# Patient Record
Sex: FEMALE | URBAN - METROPOLITAN AREA
[De-identification: names, ages, dates, MRNs, and addresses within clinical notes are randomized per-mention and may not be internally consistent; named-entity substitution may affect disease eponyms.]

---

## 2017-05-15 ENCOUNTER — APPOINTMENT (RX ONLY)
Dept: URBAN - METROPOLITAN AREA CLINIC 48 | Facility: CLINIC | Age: 82
Setting detail: DERMATOLOGY
End: 2017-05-15

## 2017-05-15 DIAGNOSIS — L81.5 LEUKODERMA, NOT ELSEWHERE CLASSIFIED: ICD-10-CM

## 2017-05-15 DIAGNOSIS — L82.1 OTHER SEBORRHEIC KERATOSIS: ICD-10-CM

## 2017-05-15 DIAGNOSIS — L57.8 OTHER SKIN CHANGES DUE TO CHRONIC EXPOSURE TO NONIONIZING RADIATION: ICD-10-CM

## 2017-05-15 PROBLEM — L85.3 XEROSIS CUTIS: Status: ACTIVE | Noted: 2017-05-15

## 2017-05-15 PROCEDURE — ? COUNSELING

## 2017-05-15 PROCEDURE — 99202 OFFICE O/P NEW SF 15 MIN: CPT

## 2017-05-15 ASSESSMENT — LOCATION DETAILED DESCRIPTION DERM
LOCATION DETAILED: RIGHT INFERIOR LATERAL MALAR CHEEK
LOCATION DETAILED: RIGHT LATERAL MALAR CHEEK
LOCATION DETAILED: RIGHT PROXIMAL DORSAL FOREARM
LOCATION DETAILED: UPPER STERNUM
LOCATION DETAILED: LEFT INFERIOR CENTRAL MALAR CHEEK
LOCATION DETAILED: LEFT PROXIMAL DORSAL FOREARM

## 2017-05-15 ASSESSMENT — LOCATION SIMPLE DESCRIPTION DERM
LOCATION SIMPLE: LEFT CHEEK
LOCATION SIMPLE: RIGHT CHEEK
LOCATION SIMPLE: CHEST
LOCATION SIMPLE: LEFT FOREARM
LOCATION SIMPLE: RIGHT FOREARM

## 2017-05-15 ASSESSMENT — LOCATION ZONE DERM
LOCATION ZONE: FACE
LOCATION ZONE: TRUNK
LOCATION ZONE: ARM

## 2017-05-15 NOTE — PROCEDURE: MIPS QUALITY
Detail Level: Detailed
Quality 110: Preventive Care And Screening: Influenza Immunization: Influenza Immunization previously received during influenza season
Quality 130: Documentation Of Current Medications In The Medical Record: Current Medications Documented
Quality 226: Preventive Care And Screening: Tobacco Use: Screening And Cessation Intervention: Patient screened for tobacco and never smoked
Quality 131: Pain Assessment And Follow-Up: Pain assessment using a standardized tool is documented as negative, no follow-up plan required
Quality 111:Pneumonia Vaccination Status For Older Adults: Pneumococcal Vaccination Previously Received
Quality 47: Advance Care Plan: Advance Care Planning discussed and documented in the medical record; patient did not wish or was not able to name a surrogate decision maker or provide an advance care plan.

## 2018-02-07 ENCOUNTER — ESTABLISHED COMPREHENSIVE EXAM (OUTPATIENT)
Dept: URBAN - METROPOLITAN AREA CLINIC 39 | Facility: CLINIC | Age: 83
End: 2018-02-07

## 2018-02-07 DIAGNOSIS — Z96.1: ICD-10-CM

## 2018-02-07 DIAGNOSIS — H35.373: ICD-10-CM

## 2018-02-07 DIAGNOSIS — Z94.7: ICD-10-CM

## 2018-02-07 PROCEDURE — G8428 CUR MEDS NOT DOCUMENT: HCPCS

## 2018-02-07 PROCEDURE — 92015 DETERMINE REFRACTIVE STATE: CPT

## 2018-02-07 PROCEDURE — 92014 COMPRE OPH EXAM EST PT 1/>: CPT

## 2018-02-07 PROCEDURE — 1036F TOBACCO NON-USER: CPT

## 2018-02-07 ASSESSMENT — VISUAL ACUITY
OD_CC: 20/25+2
OS_SC: J6
OS_SC: 20/40
OU_SC: 20/25
OU_SC: J5
OD_SC: 20/30-2
OD_SC: J8
OU_CC: 20/25+2
OS_CC: J1
OU_CC: J1
OD_CC: J2
OS_CC: 20/30-1

## 2018-02-07 ASSESSMENT — TONOMETRY
OS_IOP_MMHG: 14
OD_IOP_MMHG: 13

## 2018-03-07 ENCOUNTER — FOLLOW UP (OUTPATIENT)
Dept: URBAN - METROPOLITAN AREA CLINIC 39 | Facility: CLINIC | Age: 83
End: 2018-03-07

## 2018-03-07 DIAGNOSIS — Z96.1: ICD-10-CM

## 2018-03-07 DIAGNOSIS — Z98.890: ICD-10-CM

## 2018-03-07 DIAGNOSIS — H35.373: ICD-10-CM

## 2018-03-07 PROCEDURE — 1036F TOBACCO NON-USER: CPT

## 2018-03-07 PROCEDURE — 92012 INTRM OPH EXAM EST PATIENT: CPT

## 2018-03-07 PROCEDURE — G8427 DOCREV CUR MEDS BY ELIG CLIN: HCPCS

## 2018-03-07 PROCEDURE — G8756 NO BP MEASURE DOC: HCPCS

## 2018-03-07 ASSESSMENT — VISUAL ACUITY
OS_CC: 20/100
OD_CC: 20/20
OS_SC: 20/60-1
OD_SC: 20/40

## 2018-05-23 ENCOUNTER — ESTABLISHED COMPREHENSIVE EXAM (OUTPATIENT)
Dept: URBAN - METROPOLITAN AREA CLINIC 39 | Facility: CLINIC | Age: 83
End: 2018-05-23

## 2018-05-23 DIAGNOSIS — H04.123: ICD-10-CM

## 2018-05-23 DIAGNOSIS — Z96.1: ICD-10-CM

## 2018-05-23 DIAGNOSIS — H35.373: ICD-10-CM

## 2018-05-23 PROCEDURE — G8427 DOCREV CUR MEDS BY ELIG CLIN: HCPCS

## 2018-05-23 PROCEDURE — 92014 COMPRE OPH EXAM EST PT 1/>: CPT

## 2018-05-23 PROCEDURE — 1036F TOBACCO NON-USER: CPT

## 2018-05-23 ASSESSMENT — TONOMETRY
OS_IOP_MMHG: 10
OD_IOP_MMHG: 12

## 2018-05-23 ASSESSMENT — VISUAL ACUITY
OD_CC: J1
OS_CC: 20/40-3
OS_SC: J1
OS_CC: J1-
OS_SC: 20/50-1
OD_CC: 20/25+1
OD_SC: 20/50+1
OD_SC: J3

## 2019-02-18 ENCOUNTER — ESTABLISHED COMPREHENSIVE EXAM (OUTPATIENT)
Dept: URBAN - METROPOLITAN AREA CLINIC 39 | Facility: CLINIC | Age: 84
End: 2019-02-18

## 2019-02-18 DIAGNOSIS — H35.373: ICD-10-CM

## 2019-02-18 DIAGNOSIS — H01.004: ICD-10-CM

## 2019-02-18 DIAGNOSIS — H01.001: ICD-10-CM

## 2019-02-18 DIAGNOSIS — H04.123: ICD-10-CM

## 2019-02-18 DIAGNOSIS — H00.014: ICD-10-CM

## 2019-02-18 DIAGNOSIS — H43.813: ICD-10-CM

## 2019-02-18 PROCEDURE — 92015 DETERMINE REFRACTIVE STATE: CPT

## 2019-02-18 PROCEDURE — 92014 COMPRE OPH EXAM EST PT 1/>: CPT

## 2019-02-18 RX ORDER — NEOMYCIN SULFATE, POLYMYXIN B SULFATE AND DEXAMETHASONE 3.5; 10000; 1 MG/G; [USP'U]/G; MG/G: OINTMENT OPHTHALMIC

## 2019-02-18 ASSESSMENT — VISUAL ACUITY
OD_SC: J3
OS_CC: 20/100
OD_CC: 20/20-2
OS_SC: 20/40-1
OD_SC: 20/40-2
OS_SC: J1
OD_CC: J1
OS_CC: J1+

## 2019-02-18 ASSESSMENT — TONOMETRY
OS_IOP_MMHG: 11
OD_IOP_MMHG: 11

## 2020-02-19 ENCOUNTER — ESTABLISHED COMPREHENSIVE EXAM (OUTPATIENT)
Dept: URBAN - METROPOLITAN AREA CLINIC 39 | Facility: CLINIC | Age: 85
End: 2020-02-19

## 2020-02-19 DIAGNOSIS — H35.373: ICD-10-CM

## 2020-02-19 DIAGNOSIS — Z96.1: ICD-10-CM

## 2020-02-19 DIAGNOSIS — Z94.7: ICD-10-CM

## 2020-02-19 DIAGNOSIS — H43.813: ICD-10-CM

## 2020-02-19 DIAGNOSIS — H04.123: ICD-10-CM

## 2020-02-19 PROCEDURE — 92134 CPTRZ OPH DX IMG PST SGM RTA: CPT

## 2020-02-19 PROCEDURE — 92015 DETERMINE REFRACTIVE STATE: CPT

## 2020-02-19 PROCEDURE — 92014 COMPRE OPH EXAM EST PT 1/>: CPT

## 2020-02-19 ASSESSMENT — VISUAL ACUITY
OS_SC: 20/100
OD_SC: J5
OD_SC: 20/50
OS_SC: J1+

## 2020-02-19 ASSESSMENT — TONOMETRY
OS_IOP_MMHG: 14
OD_IOP_MMHG: 14

## 2020-02-26 ENCOUNTER — REFRACTION ONLY (OUTPATIENT)
Dept: URBAN - METROPOLITAN AREA CLINIC 39 | Facility: CLINIC | Age: 85
End: 2020-02-26

## 2020-02-26 DIAGNOSIS — Z96.1: ICD-10-CM

## 2020-02-26 PROCEDURE — 92015GRNC REFRACTION GLASSES RECHECK - NO CHARGE

## 2020-02-26 ASSESSMENT — VISUAL ACUITY
OD_SC: J2
OD_SC: 20/40
OS_SC: 20/70
OS_SC: J1+

## 2020-03-19 ENCOUNTER — REFRACTION ONLY (OUTPATIENT)
Dept: URBAN - METROPOLITAN AREA CLINIC 39 | Facility: CLINIC | Age: 85
End: 2020-03-19

## 2020-03-19 DIAGNOSIS — Z96.1: ICD-10-CM

## 2020-03-19 PROCEDURE — 92015GRNC REFRACTION GLASSES RECHECK - NO CHARGE

## 2020-03-19 ASSESSMENT — VISUAL ACUITY
OS_SC: J3
OD_SC: J4
OS_SC: 20/60-2
OD_SC: 20/40

## 2022-01-06 ENCOUNTER — COMPREHENSIVE EXAM (OUTPATIENT)
Dept: URBAN - METROPOLITAN AREA CLINIC 39 | Facility: CLINIC | Age: 87
End: 2022-01-06

## 2022-01-06 DIAGNOSIS — H02.88A: ICD-10-CM

## 2022-01-06 DIAGNOSIS — H02.88B: ICD-10-CM

## 2022-01-06 DIAGNOSIS — H43.813: ICD-10-CM

## 2022-01-06 DIAGNOSIS — H01.02A: ICD-10-CM

## 2022-01-06 DIAGNOSIS — H01.02B: ICD-10-CM

## 2022-01-06 DIAGNOSIS — Z96.1: ICD-10-CM

## 2022-01-06 DIAGNOSIS — H00.015: ICD-10-CM

## 2022-01-06 DIAGNOSIS — H35.373: ICD-10-CM

## 2022-01-06 DIAGNOSIS — H04.123: ICD-10-CM

## 2022-01-06 DIAGNOSIS — Z94.7: ICD-10-CM

## 2022-01-06 PROCEDURE — 92014 COMPRE OPH EXAM EST PT 1/>: CPT

## 2022-01-06 PROCEDURE — 92015 DETERMINE REFRACTIVE STATE: CPT

## 2022-01-06 RX ORDER — TOBRAMYCIN AND DEXAMETHASONE 1; 3 MG/ML; MG/ML: 1 SUSPENSION/ DROPS OPHTHALMIC

## 2022-01-06 ASSESSMENT — VISUAL ACUITY
OD_SC: J3
OD_CC: J1+ PAL
OS_SC: 20/100+1
OD_SC: 20/50-2
OU_SC: J1+
OD_CC: 20/30-1
OS_CC: 20/70-1
OS_CC: J1+ PAL
OS_SC: J1+
OU_SC: 20/50-2
OU_CC: J1+ PAL
OU_CC: 20/30-1

## 2022-01-06 ASSESSMENT — TONOMETRY
OS_IOP_MMHG: 14
OD_IOP_MMHG: 14

## 2022-09-01 ENCOUNTER — FOLLOW UP (OUTPATIENT)
Dept: URBAN - METROPOLITAN AREA CLINIC 39 | Facility: CLINIC | Age: 87
End: 2022-09-01

## 2022-09-01 DIAGNOSIS — H04.123: ICD-10-CM

## 2022-09-01 DIAGNOSIS — H02.88A: ICD-10-CM

## 2022-09-01 DIAGNOSIS — H01.02A: ICD-10-CM

## 2022-09-01 DIAGNOSIS — H02.88B: ICD-10-CM

## 2022-09-01 DIAGNOSIS — H01.02B: ICD-10-CM

## 2022-09-01 PROCEDURE — 92012 INTRM OPH EXAM EST PATIENT: CPT

## 2022-09-01 ASSESSMENT — VISUAL ACUITY
OS_CC: J1+
OD_CC: 20/20-1
OD_CC: J1
OU_CC: 20/20-1
OS_CC: 20/40
OU_CC: J1+

## 2022-09-01 ASSESSMENT — TONOMETRY
OD_IOP_MMHG: 11
OS_IOP_MMHG: 12

## 2025-07-29 ENCOUNTER — COMPREHENSIVE EXAM (OUTPATIENT)
Age: OVER 89
End: 2025-07-29